# Patient Record
Sex: FEMALE | Race: WHITE | URBAN - METROPOLITAN AREA
[De-identification: names, ages, dates, MRNs, and addresses within clinical notes are randomized per-mention and may not be internally consistent; named-entity substitution may affect disease eponyms.]

---

## 2018-02-15 ENCOUNTER — PAT TELEPHONE (OUTPATIENT)
Dept: PREADMISSION TESTING | Age: 73
End: 2018-02-15

## 2018-02-15 VITALS — WEIGHT: 143 LBS | BODY MASS INDEX: 27 KG/M2 | HEIGHT: 61 IN

## 2018-02-15 RX ORDER — FLUTICASONE PROPIONATE 50 MCG
1 SPRAY, SUSPENSION (ML) NASAL DAILY
COMMUNITY

## 2018-02-15 RX ORDER — SIMVASTATIN 20 MG
20 TABLET ORAL NIGHTLY
COMMUNITY

## 2018-02-15 RX ORDER — TERAZOSIN 2 MG/1
2 CAPSULE ORAL NIGHTLY
COMMUNITY

## 2018-02-15 RX ORDER — DILTIAZEM HYDROCHLORIDE 180 MG/1
360 CAPSULE, COATED, EXTENDED RELEASE ORAL DAILY
COMMUNITY

## 2018-02-15 RX ORDER — CHLORDIAZEPOXIDE HYDROCHLORIDE AND CLIDINIUM BROMIDE 5; 2.5 MG/1; MG/1
1 CAPSULE ORAL
COMMUNITY

## 2018-02-15 RX ORDER — LOSARTAN POTASSIUM 25 MG/1
75 TABLET ORAL DAILY
COMMUNITY

## 2018-02-15 RX ORDER — FEXOFENADINE HYDROCHLORIDE 60 MG/1
60 TABLET, FILM COATED ORAL DAILY
COMMUNITY

## 2018-02-15 RX ORDER — HYDROCHLOROTHIAZIDE 12.5 MG/1
12.5 TABLET ORAL DAILY
COMMUNITY

## 2018-02-15 RX ORDER — PANTOPRAZOLE SODIUM 20 MG/1
20 TABLET, DELAYED RELEASE ORAL DAILY
COMMUNITY

## 2018-02-15 NOTE — PROGRESS NOTES
[facility] time____0800________        Surgery time__0900__________    Take the following medications with a sip of water:    Do not eat or drink anything after 12:00 midnight prior to your surgery. EXCEPT PREP  This includes water chewing gum, mints and ice chips. You may brush your teeth and gargle the morning of your surgery, but do not swallow the water      You may be asked to stop blood thinners such as Coumadin, Plavix, Fragmin, Lovenox, etc., or any anti-inflammatories such as:  Aspirin, Ibuprofen, Advil, Naproxen prior to your surgery. We also ask that you stop any OTC medications such as fish oil, vitamin E, glucosamine, garlic, Multivitamins, COQ 10, etc.    We ask that you do not smoke 24 hours prior to surgery  We ask that you do not  drink any alcoholic beverages 24 hours prior to surgery     You must make arrangements for a responsible adult to take you home after your surgery. For your safety you will not be allowed to leave alone or drive yourself home. Your surgery will be cancelled if you do not have a ride home. Also for your safety, it is strongly suggested that someone stay with you the first 24 hours after your surgery. A parent or legal guardian must accompany a child scheduled for surgery and plan to stay at the hospital until the child is discharged. Please do not bring other children with you. For your comfort, please wear simple loose fitting clothing to the hospital.  Please do not bring valuables. Do not wear any make-up or nail polish on your fingers or toes      For your safety, please do not wear any jewelry or body piercing's on the day of surgery. All jewelry must be removed. If you have dentures, they will be removed before going to operating room. For your convenience, we will provide you with a container.     If you wear contact lenses or glasses, they will be removed, please bring a case for

## 2018-02-20 ENCOUNTER — SURG/PROC ORDERS (OUTPATIENT)
Dept: ANESTHESIOLOGY | Age: 73
End: 2018-02-20

## 2018-02-20 RX ORDER — SODIUM CHLORIDE 9 MG/ML
INJECTION, SOLUTION INTRAVENOUS CONTINUOUS
Status: CANCELLED | OUTPATIENT
Start: 2018-02-20

## 2018-02-20 RX ORDER — SODIUM CHLORIDE 0.9 % (FLUSH) 0.9 %
10 SYRINGE (ML) INJECTION PRN
Status: CANCELLED | OUTPATIENT
Start: 2018-02-20

## 2018-02-20 RX ORDER — SODIUM CHLORIDE 0.9 % (FLUSH) 0.9 %
10 SYRINGE (ML) INJECTION EVERY 12 HOURS SCHEDULED
Status: CANCELLED | OUTPATIENT
Start: 2018-02-20

## 2018-02-22 ENCOUNTER — HOSPITAL ENCOUNTER (OUTPATIENT)
Dept: ENDOSCOPY | Age: 73
Discharge: HOME | End: 2018-02-22
Attending: INTERNAL MEDICINE | Admitting: INTERNAL MEDICINE

## 2018-02-22 VITALS
RESPIRATION RATE: 16 BRPM | SYSTOLIC BLOOD PRESSURE: 133 MMHG | HEART RATE: 76 BPM | OXYGEN SATURATION: 100 % | HEIGHT: 61 IN | TEMPERATURE: 98 F | DIASTOLIC BLOOD PRESSURE: 75 MMHG | BODY MASS INDEX: 26.13 KG/M2 | WEIGHT: 138.38 LBS

## 2018-02-22 RX ORDER — SODIUM CHLORIDE 0.9 % (FLUSH) 0.9 %
10 SYRINGE (ML) INJECTION EVERY 12 HOURS SCHEDULED
Status: DISCONTINUED | OUTPATIENT
Start: 2018-02-22 | End: 2018-02-23 | Stop reason: HOSPADM

## 2018-02-22 RX ORDER — SODIUM CHLORIDE 9 MG/ML
INJECTION, SOLUTION INTRAVENOUS CONTINUOUS
Status: DISCONTINUED | OUTPATIENT
Start: 2018-02-22 | End: 2018-02-23 | Stop reason: HOSPADM

## 2018-02-22 RX ORDER — SODIUM CHLORIDE 0.9 % (FLUSH) 0.9 %
10 SYRINGE (ML) INJECTION PRN
Status: DISCONTINUED | OUTPATIENT
Start: 2018-02-22 | End: 2018-02-23 | Stop reason: HOSPADM

## 2018-02-22 RX ADMIN — SODIUM CHLORIDE: 9 INJECTION, SOLUTION INTRAVENOUS at 08:41

## 2018-02-22 ASSESSMENT — LIFESTYLE VARIABLES: SMOKING_STATUS: 0

## 2018-02-22 ASSESSMENT — PAIN - FUNCTIONAL ASSESSMENT: PAIN_FUNCTIONAL_ASSESSMENT: 0-10

## 2018-02-22 ASSESSMENT — PAIN SCALES - GENERAL: PAINLEVEL_OUTOF10: 0

## 2018-02-22 NOTE — H&P
[facility]   Pre-operative History and Physical    Patient: Kiran Cat  : 1945  Acct#: [de-identified]    History Obtained From: electronic medical record    HISTORY OF PRESENT ILLNESS  Procedure:Colonoscopy  Indications:surveillance  Past Medical History:        Diagnosis Date    Asthma     Hyperlipidemia     Hypertension      Past Surgical History:        Procedure Laterality Date    COLONOSCOPY      HERNIA REPAIR       Medications Prior to Admission:   Current Outpatient Prescriptions on File Prior to Encounter   Medication Sig Dispense Refill    diltiazem (CARDIZEM CD) 180 MG extended release capsule Take 360 mg by mouth daily      hydrochlorothiazide (HYDRODIURIL) 12.5 MG tablet Take 12.5 mg by mouth daily      losartan (COZAAR) 25 MG tablet Take 75 mg by mouth daily      simvastatin (ZOCOR) 20 MG tablet Take 20 mg by mouth nightly      terazosin (HYTRIN) 2 MG capsule Take 2 mg by mouth nightly      fexofenadine (ALLEGRA ALLERGY) 60 MG tablet Take 60 mg by mouth daily      fluticasone (FLONASE) 50 MCG/ACT nasal spray 1 spray by Nasal route daily      aspirin 81 MG tablet Take 81 mg by mouth daily      Polyethylene Glycol 3350 (MIRALAX PO) Take by mouth      Calcium Carbonate-Vitamin D (CALTRATE 600+D PO) Take by mouth      pantoprazole (PROTONIX) 20 MG tablet Take 20 mg by mouth daily      chlordiazePOXIDE-clidinium (LIBRAX) 5-2.5 MG per capsule Take 1 capsule by mouth 4 times daily (before meals and nightly). No current facility-administered medications on file prior to encounter. Allergies:  Amoxil [amoxicillin]; Demerol hcl [meperidine]; Neosporin [neomycin-polymyxin-gramicidin]; and Zantac [ranitidine hcl]  Social History     Social History    Marital status:      Spouse name: N/A    Number of children: N/A    Years of education: N/A     Occupational History    Not on file.      Social History Main Topics    Smoking status: Former Smoker    Smokeless

## 2018-02-22 NOTE — ANESTHESIA PRE-OP
Prescriptions   Medication Sig Dispense Refill    diltiazem (CARDIZEM CD) 180 MG extended release capsule Take 360 mg by mouth daily      hydrochlorothiazide (HYDRODIURIL) 12.5 MG tablet Take 12.5 mg by mouth daily      losartan (COZAAR) 25 MG tablet Take 75 mg by mouth daily      simvastatin (ZOCOR) 20 MG tablet Take 20 mg by mouth nightly      terazosin (HYTRIN) 2 MG capsule Take 2 mg by mouth nightly      fexofenadine (ALLEGRA ALLERGY) 60 MG tablet Take 60 mg by mouth daily      fluticasone (FLONASE) 50 MCG/ACT nasal spray 1 spray by Nasal route daily      aspirin 81 MG tablet Take 81 mg by mouth daily      Polyethylene Glycol 3350 (MIRALAX PO) Take by mouth      Calcium Carbonate-Vitamin D (CALTRATE 600+D PO) Take by mouth      pantoprazole (PROTONIX) 20 MG tablet Take 20 mg by mouth daily      chlordiazePOXIDE-clidinium (LIBRAX) 5-2.5 MG per capsule Take 1 capsule by mouth 4 times daily (before meals and nightly). Current Facility-Administered Medications   Medication Dose Route Frequency Provider Last Rate Last Dose    0.9 % sodium chloride infusion   Intravenous Continuous Ankita Avila MD        famotidine (PEPCID) injection 20 mg  20 mg Intravenous Once Ankita Avila MD        sodium chloride flush 0.9 % injection 10 mL  10 mL Intravenous 2 times per day Ankita Avila MD        sodium chloride flush 0.9 % injection 10 mL  10 mL Intravenous PRN Ankita Avila MD         Vital Signs (Current) There were no vitals filed for this visit. Vital Signs Statistics (for past 48 hrs)     No Data Recorded    BP Readings from Last 3 Encounters:   No data found for BP     BMI  There is no height or weight on file to calculate BMI. Estimated body mass index is 27.02 kg/m² as calculated from the following:    Height as of 2/15/18: 5' 1\" (1.549 m). Weight as of 2/15/18: 143 lb (64.9 kg).     CBC No results found for: WBC, RBC, HGB, HCT, MCV, RDW, PLT  CMP